# Patient Record
Sex: MALE | Race: WHITE | NOT HISPANIC OR LATINO | Employment: UNEMPLOYED | URBAN - METROPOLITAN AREA
[De-identification: names, ages, dates, MRNs, and addresses within clinical notes are randomized per-mention and may not be internally consistent; named-entity substitution may affect disease eponyms.]

---

## 2023-05-14 ENCOUNTER — OFFICE VISIT (OUTPATIENT)
Dept: URGENT CARE | Facility: URGENT CARE | Age: 53
End: 2023-05-14
Payer: COMMERCIAL

## 2023-05-14 VITALS
DIASTOLIC BLOOD PRESSURE: 84 MMHG | TEMPERATURE: 97.3 F | OXYGEN SATURATION: 99 % | SYSTOLIC BLOOD PRESSURE: 122 MMHG | HEART RATE: 58 BPM
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
## 2023-05-14 DIAGNOSIS — S30.861A INFECTED TICK BITE OF ABDOMINAL WALL, INITIAL ENCOUNTER: Primary | ICD-10-CM

## 2023-05-14 DIAGNOSIS — L08.9 INFECTED TICK BITE OF ABDOMINAL WALL, INITIAL ENCOUNTER: Primary | ICD-10-CM

## 2023-05-14 DIAGNOSIS — E78.5 HYPERLIPIDEMIA LDL GOAL <130: ICD-10-CM

## 2023-05-14 PROCEDURE — 99203 OFFICE O/P NEW LOW 30 MIN: CPT | Performed by: FAMILY MEDICINE

## 2023-05-14 RX ORDER — SIMVASTATIN 40 MG
40 TABLET ORAL AT BEDTIME
Qty: 60 TABLET | Refills: 0 | Status: SHIPPED | OUTPATIENT
Start: 2023-05-14

## 2023-05-14 RX ORDER — DOXYCYCLINE 100 MG/1
100 CAPSULE ORAL 2 TIMES DAILY
Qty: 20 CAPSULE | Refills: 0 | Status: SHIPPED | OUTPATIENT
Start: 2023-05-14 | End: 2023-05-24

## 2023-05-14 ASSESSMENT — PAIN SCALES - GENERAL: PAINLEVEL: NO PAIN (0)

## 2023-05-14 NOTE — PROGRESS NOTES
ASSESSMENT/ PLAN:  Infected tick bite of abdominal wall, initial encounter     - doxycycline hyclate (VIBRAMYCIN) 100 MG capsule; Take 1 capsule (100 mg) by mouth 2 times daily for 10 days    Patient agreed to prophylactic treatment of the tick bite/  Possible Lyme disease    Hyperlipidemia LDL goal <130     - simvastatin (ZOCOR) 40 MG tablet; Take 1 tablet (40 mg) by mouth At Bedtime      Patient was on simvastatin 40 mg in the past,  Had significant drop in cholesterol. Wanted second opinion after his primary recently stopped his simvastatin-  He has slight elevation of LFT.  He wants to restart simvastatin since he has family history of cardiac disease.    Patient was given 2 months of RX of simvastatin 40 mg-  Will need to discuss with primary care about ongoing managmentt         -------------------------------------------------------------------------------------------------------------------------------------------------------------      SUBJECTIVE:  Chief Complaint   Patient presents with     Urgent Care     Redness around hip area from where tick bit him last week.      Nolberto Abdullahi is a 52 year old male who presents to the clinic today for a tick bite with    accompanying rash.  The embedded tick was discovered and removed 1 week(s) ago.     Onset of rash was 4 day(s) ago.   The patient has a history of recent travel - hunting in the woods when he found the small tick  Rash is gradual onset, still present and constant.   Location of the rash:  left   Lower abdomen.  Quality/symptoms of rash: red , some itching  Symptoms are mild and rash seems to be not changing over the course of time.     Associated symptoms include: nothing.  No associated fevers, chills, myalgias, arthralgias, chest pain      He wants refill of simvastatin 40 mg-  Had been on over the past years,  New physician did not refill      Past Medical History:   Diagnosis Date     Hyperlipaemia      There is no problem list on file for this  patient.      ALLERGIES:  Penicillins    MEDs  No current outpatient medications on file prior to visit.  No current facility-administered medications on file prior to visit.          ROS:  CONSTITUTIONAL:NEGATIVE for fever, chills, change in weight  EYES: NEGATIVE for vision changes or irritation  ENT/MOUTH: NEGATIVE for ear, mouth and throat problems  RESP:NEGATIVE for significant cough or SOB    EXAM:   /84   Pulse 58   Temp 97.3  F (36.3  C) (Tympanic)   SpO2 99%   GENERAL: alert, no acute distress.  SKIN: Rash description:    Distribution: localized  Location: left  Lower abdomen    Color: red,  Lesion type: maculopapular, blotchy, oval with inflammation        EYES:   EOMI,   conjunctiva clear  HENT:   External ears with no swelling or lesions   Nose and lips without  Swelling, ulcers, erythema or lesions  NECK:   normal pain free ROM  RESP:   no labored respirations, no tachypnea  EXTREMITIES:   Full ROM without expression of pain or limitation x 4 extremities  NEURO:   Normal strength and tone, ambulation without difficulty,   normal speech and mentation  SKIN:  no suspicious lesions or rashes